# Patient Record
Sex: MALE | Race: WHITE | NOT HISPANIC OR LATINO | Employment: FULL TIME | URBAN - METROPOLITAN AREA
[De-identification: names, ages, dates, MRNs, and addresses within clinical notes are randomized per-mention and may not be internally consistent; named-entity substitution may affect disease eponyms.]

---

## 2020-09-15 ENCOUNTER — CONVERSION ENCOUNTER (OUTPATIENT)
Dept: SURGERY | Facility: CLINIC | Age: 50
End: 2020-09-15

## 2020-09-15 ENCOUNTER — OFFICE VISIT CONVERTED (OUTPATIENT)
Dept: SURGERY | Facility: CLINIC | Age: 50
End: 2020-09-15
Attending: SURGERY

## 2020-10-09 ENCOUNTER — HOSPITAL ENCOUNTER (OUTPATIENT)
Dept: PREADMISSION TESTING | Facility: HOSPITAL | Age: 50
Discharge: HOME | End: 2020-10-09
Attending: SURGERY

## 2020-10-11 LAB — SARS-COV-2 RNA SPEC QL NAA+PROBE: NOT DETECTED

## 2020-10-14 ENCOUNTER — HOSPITAL ENCOUNTER (OUTPATIENT)
Dept: PERIOP | Facility: HOSPITAL | Age: 50
Setting detail: HOSPITAL OUTPATIENT SURGERY
Discharge: HOME | End: 2020-10-14
Attending: SURGERY

## 2020-10-27 ENCOUNTER — OFFICE VISIT CONVERTED (OUTPATIENT)
Dept: SURGERY | Facility: CLINIC | Age: 50
End: 2020-10-27
Attending: SURGERY

## 2020-11-17 ENCOUNTER — OFFICE VISIT CONVERTED (OUTPATIENT)
Dept: SURGERY | Facility: CLINIC | Age: 50
End: 2020-11-17
Attending: SURGERY

## 2021-04-29 ENCOUNTER — HOSPITAL ENCOUNTER (OUTPATIENT)
Dept: URGENT CARE | Facility: CLINIC | Age: 51
Discharge: HOME | End: 2021-04-29
Attending: NURSE PRACTITIONER

## 2021-05-10 NOTE — H&P
"   History and Physical      Patient Name: Alvarez Ortez   Patient ID: [id number]   Sex: Male   YOB: 1970        Visit Date: September 15, 2020    Provider: Kojo Rios MD   Location: [facility]   Location Address: [address] [place] [address]  [id number]   Location Phone: [phone]          Chief Complaint  · Outpatient History & Physical / Surgical Orders  · Hernia Consult      History Of Present Illness     Mr. Ortez came in today for evaluation. He has a painful left inguinal hernia. He also has an umbilical hernia. He would like to get these repaired.       Past Medical History  ***No Significant Medical History; Limb Swelling; Seasonal allergies         Past Surgical History  Joint Surgery; Metal implants         Medication List  Percocet 5-325 mg oral tablet         Allergy List  Benadryl       Allergies Reconciled  Family Medical History  Stroke; Heart Disease; Diabetes, unspecified type; Family history of breast cancer; Family history of certain chronic disabling diseases; arthritis         Social History  Alcohol Use (Current some day); Claustophobic (Unknown); .; lives alone; Recreational Drug Use (Never); Tobacco (Never); Working         Review of Systems  · Constitutional  o Denies  o : fever  · Eyes  o Denies  o : yellowish discoloration of eyes  · HENT  o Denies  o : difficulty swallowing  · Cardiovascular  o Denies  o : chest pain on exertion  · Respiratory  o Denies  o : shortness of breath  · Gastrointestinal  o Denies  o : nausea, vomiting, diarrhea, constipation  · Integument  o Denies  o : rash  · Neurologic  o Denies  o : tingling or numbness  · Musculoskeletal  o Denies  o : joint pain  · Endocrine  o Denies  o : weight gain, weight loss      Vitals  Date Time BP Position Site L\R Cuff Size HR RR TEMP (F) WT  HT  BMI kg/m2 BSA m2 O2 Sat HC       09/15/2020 02:02 /0 Sitting    9 - R 14  200lbs 0oz 5'  9\" 29.53 2.1           Physical " Examination  · Constitutional  o Appearance  o : well-nourished, well developed, alert, in no acute distress  · Head and Face  o Head  o :   § Inspection  § : atraumatic, normocephalic  · Neck  o Inspection/Palpation  o : supple, normal range of motion  · Respiratory  o Inspection of Chest  o : normal inspection  o Auscultation of Lungs  o : breath sounds normal, no distress, clear to ascultate bilaterally  · Cardiovascular  o Heart  o :   § Auscultation of Heart  § : regular rate and rhythm, no murmur, gallop or rub  · Gastrointestinal  o Abdominal Examination  o : normal bowel sounds, non-tender, soft. Umbilical hernia noted.  · Groin  o Groin  o : LIH noted          Assessment  · Pre-Surgical Orders     V72.84  · Non-recurrent unilateral inguinal hernia without obstruction or gangrene     550.90/K40.90  · Hernia, Umibilical     553.1       Symptomatic hernias in the left inguinal canal and umbilicus.       Plan  · Orders  o [facility] Pre-Op Covid-19 Screening (70942) - 553.1, 550.90/K40.90 - 10/14/2020  o General Surgery Order (GENOR) - 553.1, 550.90/K40.90 - 10/14/2020  · Medications  o Medications have been Reconciled  o Transition of Care or Provider Policy  · Instructions  o PLAN:  o Handouts Provided-Pre-Procedure Instructions including date and time and location of procedure.  o Surgical Facility: [facility]  o ****Surgical Orders****  o ****Patient Status****  o Outpatient  o RISK AND BENEFITS:  o Consent for surgery: Given these options, the patient has verbally expressed an understanding of the risks of surgery and finds these risks acceptable. We will proceed with surgery as soon as possible.  o Consult Anesthesia for any post-operative block, or any pain management procedure deemed necessary by the anestesiologist for adequate post-operative pain control.   o O.R. PREP: Per protocol  o SCD's preoperatively  o PLEASE SIGN PERMIT FOR: Robotic left inguinal hernia repair/ Laparoscopic umbilical  hernia repair  o *__Kefzol 2 gram IV on call to OR.  o *___The above History and Physical Examination has been completed within 30 days of admission.  o Electronically Identified Patient Education Materials Provided Electronically     We will set him up for a robotic left inguinal hernia repair and a laparoscopic umbilical hernia repair. I have described the procedure to him as well as the risks and benefits and he is agreeable to proceeding.             Electronically Signed by: Evelina Cartwright-, -Author on September 16, 2020 02:30:23 PM  Electronically Co-signed by: Kojo Rios MD -Reviewer on September 21, 2020 01:03:08 PM

## 2021-05-13 NOTE — PROGRESS NOTES
"   Progress Note      Patient Name: Alvarez Ortez   Patient ID: [id number]   Sex: Male   YOB: 1970        Visit Date: October 27, 2020    Provider: Kojo Rios MD   Location: [facility]   Location Address: [address] [place] [address]  [id number]   Location Phone: [phone]          Chief Complaint  · Follow Up Office Visit      History Of Present Illness     Mr. Ortez came back for follow-up. He is doing okay following a robotic left inguinal hernia repair and laparoscopic umbilical hernia repair. He had a lot of testicular swelling after the case. He is still fairly sore there.       Past Medical History  ***No Significant Medical History; Limb Swelling; Seasonal allergies         Past Surgical History  Joint Surgery; Metal implants         Medication List  Percocet 5-325 mg oral tablet         Allergy List  Benadryl         Family Medical History  Stroke; Heart Disease; Diabetes, unspecified type; Family history of breast cancer; Family history of certain chronic disabling diseases; arthritis         Social History  Alcohol Use (Current some day); Claustophobic (Unknown); .; lives alone; Recreational Drug Use (Never); Tobacco (Never); Working         Review of Systems  · Cardiovascular  o Denies  o : chest pain, irregular heart beats, rapid heart rate, chest pain on exertion, shortness of breath, lower extremity swelling  · Respiratory  o Denies  o : shortness of breath, wheezing, cough, wheezing, chronic cough, coughing up blood  · Gastrointestinal  o Denies  o : nausea, vomiting, diarrhea, chronic abdominal pain, reflux symptoms      Vitals  Date Time BP Position Site L\R Cuff Size HR RR TEMP (F) WT  HT  BMI kg/m2 BSA m2 O2 Sat FR L/min FiO2        10/27/2020 01:44 PM       15  200lbs 5oz 5'  9\" 29.58 2.1             Physical Examination     Today on physical exam, his incisions look good. He has a little bit of fluid in his umbilical hernia sac. He has a " pretty swollen left testicle. He doesn't appear to have a hematoma. His hernia repair feels intact.           Assessment  · Postoperative Exam Following Surgery     V67.00       Status post robotic left inguinal hernia repair and laparoscopic umbilical hernia repair. He appears to have a pretty good case of ischemic orchitis.       Plan  · Medications  o Medications have been Reconciled  o Transition of Care or Provider Policy     We will see him back in two weeks. I have asked him to continue to use some ice. He is also doing non-steroidals and acetaminophen for pain. I will see him back in two weeks and see what it looks like then.             Electronically Signed by: Evelina Cartwright-, -Author on October 28, 2020 09:08:19 AM  Electronically Co-signed by: Kojo Rios MD -Reviewer on November 2, 2020 01:51:29 PM   <<----- Click to add NO significant Past Surgical History

## 2021-05-13 NOTE — PROGRESS NOTES
"   Progress Note      Patient Name: Alvarez Ortez   Patient ID: [id number]   Sex: Male   YOB: 1970        Visit Date: November 17, 2020    Provider: Kojo Rios MD   Location: [facility]   Location Address: [address] [place] [address]  [id number]   Location Phone: [phone]          Chief Complaint  · Follow Up Office Visit      History Of Present Illness     Mr. Ortez came back for follow-up. He is doing much better now status post his robotic left inguinal hernia repair and laparoscopic umbilical hernia repair. His testicular swelling is much better.       Past Medical History  ***No Significant Medical History; Limb Swelling; Seasonal allergies         Past Surgical History  Hernia; Joint Surgery; Metal implants         Medication List  Percocet 5-325 mg oral tablet         Allergy List  Benadryl         Family Medical History  Stroke; Heart Disease; Diabetes, unspecified type; Family history of breast cancer; Family history of certain chronic disabling diseases; arthritis         Social History  Alcohol Use (Current some day); Claustophobic (Unknown); .; lives alone; Recreational Drug Use (Never); Tobacco (Never); Working         Review of Systems  · Cardiovascular  o Denies  o : chest pain, irregular heart beats, rapid heart rate, chest pain on exertion, shortness of breath, lower extremity swelling  · Respiratory  o Denies  o : shortness of breath, wheezing, cough, wheezing, chronic cough, coughing up blood  · Gastrointestinal  o Denies  o : nausea, vomiting, diarrhea, chronic abdominal pain, reflux symptoms      Vitals  Date Time BP Position Site L\R Cuff Size HR RR TEMP (F) WT  HT  BMI kg/m2 BSA m2 O2 Sat FR L/min FiO2 HC       11/17/2020 03:11 PM       16  202lbs 0oz 5'  9\" 29.83 2.11             Physical Examination     Today on physical exam, this area looks much better. His testicles look much less swollen.           Assessment  · Postoperative Exam " Following Surgery     V67.00       Doing better status post robotic left inguinal hernia repair and a laparoscopic umbilical hernia repair.       Plan  · Medications  o Medications have been Reconciled  o Transition of Care or Provider Policy  · Instructions  o Follow up as needed.  o He is back to work.             Electronically Signed by: Evelina Cartwright-, -Author on November 18, 2020 10:04:12 AM  Electronically Co-signed by: Kojo Rios MD -Reviewer on November 18, 2020 01:11:13 PM

## 2021-05-14 VITALS — WEIGHT: 200 LBS | RESPIRATION RATE: 14 BRPM | BODY MASS INDEX: 29.62 KG/M2 | HEIGHT: 69 IN | HEART RATE: 9 BPM

## 2021-05-14 VITALS — BODY MASS INDEX: 29.67 KG/M2 | HEIGHT: 69 IN | RESPIRATION RATE: 15 BRPM | WEIGHT: 200.31 LBS

## 2021-05-14 VITALS — HEIGHT: 69 IN | BODY MASS INDEX: 29.92 KG/M2 | WEIGHT: 202 LBS | RESPIRATION RATE: 16 BRPM

## 2023-04-24 ENCOUNTER — OFFICE VISIT (OUTPATIENT)
Dept: ORTHOPEDIC SURGERY | Facility: CLINIC | Age: 53
End: 2023-04-24
Payer: COMMERCIAL

## 2023-04-24 VITALS — BODY MASS INDEX: 29.62 KG/M2 | WEIGHT: 200 LBS | HEIGHT: 69 IN | HEART RATE: 72 BPM | OXYGEN SATURATION: 94 %

## 2023-04-24 DIAGNOSIS — M72.2 PLANTAR FASCIITIS: Primary | ICD-10-CM

## 2023-04-24 RX ORDER — METHYLPREDNISOLONE 4 MG/1
1 TABLET ORAL DAILY
Qty: 21 TABLET | Refills: 0 | Status: SHIPPED | OUTPATIENT
Start: 2023-04-24

## 2023-04-24 NOTE — PROGRESS NOTES
"Chief Complaint  Initial Evaluation and Pain of the Left Foot    Subjective          Alvarez Ortez presents to [facility] for   History of Present Illness    The patient presents here today for evaluation of the left foot. He reports foot pain that started last week. He reports his pain was in the arch and heel. He reports he got arch support soles that relieved his pain. He denies injury, trauma, numbness and tingling.     Allergies   Allergen Reactions   • Diphenhydramine Swelling        Social History     Socioeconomic History   • Marital status: Single   Tobacco Use   • Smoking status: Never     Passive exposure: Never   • Smokeless tobacco: Current     Types: Snuff   Vaping Use   • Vaping Use: Never used   Substance and Sexual Activity   • Alcohol use: Yes     Comment: 2 BEERS A NIGHT   • Drug use: Never   • Sexual activity: Defer        I reviewed the patient's chief complaint, history of present illness, review of systems, past medical history, surgical history, family history, social history, medications, and allergy list.     REVIEW OF SYSTEMS    Constitutional: Denies fevers, chills, weight loss  Cardiovascular: Denies chest pain, shortness of breath  Skin: Denies rashes, acute skin changes  Neurologic: Denies headache, loss of consciousness  MSK: Left foot pain      Objective   Vital Signs:   Pulse 72   Ht 175.3 cm (69\")   Wt 90.7 kg (200 lb)   SpO2 94%   BMI 29.53 kg/m²     Body mass index is 29.53 kg/m².    Physical Exam    General: Alert. No acute distress.   Left foot- Positive EHL, FHL, GS and TA. Sensation intact to all 5 nerves of the foot. Positive pulses. Neurovascularly intact. Tender to the mid plantar fascia. Dorsiflexion 5. Good capillary refill.     Procedures    Imaging Results (Most Recent)     None                   Assessment and Plan        XR Ankle 3+ View Left    Result Date: 4/17/2023  Narrative: PROCEDURE: XR ANKLE 3+ VW LEFT  COMPARISON: None  " INDICATIONS: 53M with left foot/ankle pain and swelling. Pain to sole and dorsal side of foot and pain/swelling to lateral ankle. Decreased ROM. Please evaluate.  FINDINGS:  There is no evidence for displaced fracture or dislocation. The ankle mortise is intact. No focal osseous abnormalities are seen. The soft tissues are unremarkable.      Impression:   1. No evidence for displaced fracture or dislocation. The ankle mortise is intact.     FLORIDALMA SAUCEDO MD       Electronically Signed and Approved By: FLORIDALMA SAUCEDO MD on 4/17/2023 at 21:52             XR Foot 3+ View Left    Result Date: 4/17/2023  Narrative: PROCEDURE: XR FOOT 3+ VW LEFT  COMPARISON: None  INDICATIONS: 53M with left foot/ankle pain and swelling. Pain to sole and dorsal side of foot and pain/swelling to lateral ankle. Decreased ROM. Please evaluate.  FINDINGS: There is no evidence for displaced fracture or dislocation. No focal osseous abnormalities are seen. The soft tissues are unremarkable.      Impression:  No evidence for displaced fracture or dislocation.     FLORIDALMA SAUCEDO MD       Electronically Signed and Approved By: FLORIDALMA SAUCEDO MD on 4/17/2023 at 21:52                Diagnoses and all orders for this visit:    1. Plantar fasciitis (Primary)      Discussed the treatment plan with the patient.  Plan to continue the inserts. Plan for home exercises at this time, these were given today. Prescription for a medrol dose pack.     Call or return if worsening symptoms.    Scribed for Sawyer Dale MD by Toshia De Leon  04/24/2023   09:07 EDT         Follow Up       PRN    Patient was given instructions and counseling regarding his condition or for health maintenance advice. Please see specific information pulled into the AVS if appropriate.       I have personally performed the services described in this document as scribed by the above individual and it is both accurate and complete.     Sawyer Dale MD  04/24/23  09:11 EDT